# Patient Record
(demographics unavailable — no encounter records)

---

## 2018-01-01 NOTE — CONSULT
- Maternal History


Mother's Age: 43 yo


 Status: 


Mother's Blood Type: O pos


HBSAG: Negative


Date: 18


RPR: Negative


Date: 18


Group B Strep: Positive


GBS Treated in Labor: Yes


HIV: Negative





- Maternal Risks


OB Risks: AMA,  X3, h/o Cholecystectomy, vertigo tx with Meclizine prior to 

pregnancy- since resolved. CAN X1. Infant admitted to well baby at 1728.





[place] Data





- Admission


Date of Admission: 10/08/18


Admission Time: 17:13


Date of Delivery: 10/08/18


Time of Delivery: 17:13


Wks Gestation by Dates: 42.3


Wks Gestation by Sono: 39.6


Infant Gender: Female


Type of Delivery: 


Apgar Score @1 Minute: 8


Apgar score @ 5 Minutes: 9


Birth Weight: 3.067 kg


Birth Length: 45.72 cm


Head Circumference, Admission: 33


Chest Circumference: 35


Abdominal Girth: 32.5





- Vital Signs


  ** Left Upper Arm


Blood Pressure: 61/47


Blood Pressure Mean: 51





  ** Right Upper Arm


Blood Pressure: 69/46


Blood Pressure Mean: 53





  ** Left Calf


Blood Pressure: 62/41


Blood Pressure Mean: 48





  ** Right Calf


Blood Pressure: 68/42


Blood Pressure Mean: 50





- Labs


Labs: 


 Baby's Blood Type, Trent











Cord Blood Type  O POSITIVE   10/08/18  17:13    


 


TAN, Poly Interpret  Negative  (NEGATIVE)   10/08/18  17:13    














Level 2, History and Physical


 History: 





Present at delivery for NRFHT: Ex 39.6 weeker( 42 by dates) born via  to a 

43 yo  mother. GBS positive, no prolonged ROM, rest of prenatal labs 

negative. Baby was placed under warmer. PPV via bag and mask given for less 

then 1 min for decreased respiratory efforts low tone and cyanosis. By 1 min of 

life baby had spontaneous cry, good respiratory efforts and tone and color were 

improving. By 5 min of life baby was pink, strong cry, good respiratory 

efforts. Apgars 8 and 9 at 1 and 5 min of life. 





- [facility] Infant


Birth Weight: 3.067 kg


Birth Length: 45.72 cm


Vital Signs: 


 Vital Signs











Temperature  36.9 C   10/09/18 03:00


 


Pulse Rate  178 H  10/08/18 17:51


 


Respiratory Rate  51   10/08/18 17:51


 


Blood Pressure  61/47   10/09/18 00:15


 


O2 Sat by Pulse Oximetry (%)  100   10/08/18 17:51











Chest Circumference: 35


General Appearance: Yes: No Abnormalities, Well flexed, Full ROM, Spontaneous 

movements


Skin: Yes: No Abnormalities, Wrinkled


Head: Yes: Molding


Eyes: Yes: No Abnormalities


Ears: Yes: No Abnormalities


Nose: Yes: No Abnormalities


Mouth: Yes: No Abnormalities


Chest: Yes: No Abnormalities


Lungs/Respiratory: Yes: No Abnormalities, Bilateral good air entry


Cardiac: Yes: No Abnormalities


Abdomen: Yes: No Abnormalities, Umb Ves, 2 artery 1 vein


Gastrointestinal: Yes: No Abnormalities


Genitalia: No Abnormalities


Genitalia, Female: Yes: Hymenal tags


Anus: Yes: No Abnormalities


Extremities: Yes: No Abnormalities, 10 Fingers, 10 Toes


Spine: Yes: No Abnormalities


Reflexes: [place]: Present


Neuro: Yes: No Abnormalities, Alert, Active


Cry: Yes: No Abnormalities, Strong





Problem List





- Problems


(1) Term  delivered vaginally, current hospitalization


Code(s): Z38.00 - SINGLE LIVEBORN INFANT, DELIVERED VAGINALLY   





Assessment/Plan





Ex 39 weeker born via  to a 43 yo  mother with NRFHT. GBS positive, no 

prolonged ROM, rest of prenatal labs negative. Baby was placed under warmer. 

PPV via bag and mask given for less then 1 min for decreased respiratory 

efforts low tone and cyanosis. By 1 min of life baby had spontaneous cry, good 

respiratory efforts and tone and color were improving. By 5 min of life baby 

was pink, strong cry, good respiratory efforts. Apgars 8 and 9 at 1 and 5 min 

of life. Recommend routine care in well baby nursery.

## 2018-01-01 NOTE — DS
- Maternal History


Mother's Age: 43 yo


 Status: 


Mother's Blood Type: O pos


HBSAG: Negative


Date: 18


RPR: Negative


Date: 18


Group B Strep: Positive


GBS Treated in Labor: Yes


HIV: Negative





- Maternal Risks


OB Risks: AMA,  X3, h/o Cholecystectomy, vertigo tx with Meclizine prior to 

pregnancy- since resolved. CAN X1. Infant admitted to well baby at 1728.





[place] Data





- Admission


Date of Admission: 10/08/18


Admission Time: 17:13


Date of Delivery: 10/08/18


Time of Delivery: 17:13


Wks Gestation by Dates: 42.3


Wks Gestation by Sono: 39.6


Infant Gender: Female


Type of Delivery: 


Apgar Score @1 Minute: 8


Apgar score @ 5 Minutes: 9


Birth Weight: 6 lb 12.185 oz


Birth Length: 18 in


Head Circumference, Admission: 33


Chest Circumference: 35


Abdominal Girth: 32.5





- Vital Signs


  ** Left Upper Arm


Blood Pressure: 61/47


Blood Pressure Mean: 51





  ** Right Upper Arm


Blood Pressure: 69/46


Blood Pressure Mean: 53





  ** Left Calf


Blood Pressure: 62/41


Blood Pressure Mean: 48





  ** Right Calf


Blood Pressure: 68/42


Blood Pressure Mean: 50





- Hearing Screen


Left Ear: Passed


Right Ear: Passed


Hearing Screen Complete: 10/09/18





- Labs


Labs: 


 Transcutaneous Bilirubin











Transcutaneous Bilirubin       10/09/18





performed                      


 


Transcutaneous Bilirubin       6.8





result                         











 Baby's Blood Type, Trent











Cord Blood Type  O POSITIVE   10/08/18  17:13    


 


TAN, Poly Interpret  Negative  (NEGATIVE)   10/08/18  17:13    














- [facility] Screening


 Screening Card Number: [id number]





- Hepatitis B Vaccine Given


Date: 





10/9/18





[place] PE, Discharge





- Physical Exam


Last Weight Documented: 6 lb 8 oz


Vital Signs: 


 Vital Signs











Temperature  99.0 F   10/10/18 07:56


 


Pulse Rate  178 H  10/08/18 17:51


 


Respiratory Rate  51   10/08/18 17:51


 


Blood Pressure  61/47   10/09/18 11:37


 


O2 Sat by Pulse Oximetry (%)  100   10/08/18 17:51








 SpO2





Preductal SpO2, Right Arm        99


Postductal SpO2 [Right Leg]      99








General Appearance: Yes: No Abnormalities


Skin: Yes: No Abnormalities


Head: Yes: No Abnormalities


Eyes: Yes: No Abnormalities


Ears: Yes: No Abnormalities


Nose: Yes: No Abnormalities


Mouth: Yes: No Abnormalities


Chest: Yes: No Abnormalities


Lungs/Respiratory: Yes: No Abnormalities


Cardiac: Yes: No Abnormalities


Abdomen: Yes: No Abnormalities


Gastrointestinal: Yes: No Abnormalities


Genitalia: No Abnormalities


Genitalia, Female: Yes: Hymenal tags


Anus: Yes: No Abnormalities


Extremities: Yes: No Abnormalities


Spine: Yes: No Abnormalities


Reflexes: [place]: Present, Rooting: Present, Sucking: Present


Neuro: Yes: No Abnormalities, Alert, Active


Cry: Yes: Strong


Preductal SpO2, Right Arm: 99


  ** Right Leg


Postductal SpO2: 99


Other Findings/Remarks: 





Well 








Discharge Summary


Reason For Visit: 


Current Active Problems





Term  delivered vaginally, current hospitalization (Acute)








Condition: Good





- Instructions


Diet, Activity, Other Instructions: 


The baby has its first appointment to see 


Selma Murillo and Allegra at 


[address] [place] [address] 


([phone]) on Tuesday 10/16/18 at 9:30am sharp.


Disposition: HOME

## 2018-01-01 NOTE — HP
- Maternal History


Mother's Age: 43 yo


 Status: 


Mother's Blood Type: O pos


HBSAG: Negative


Date: 18


RPR: Negative


Date: 18


Group B Strep: Positive


GBS Treated in Labor: Yes


HIV: Negative





- Maternal Risks


OB Risks: AMA,  X3, h/o Cholecystectomy, vertigo tx with Meclizine prior to 

pregnancy- since resolved. CAN X1. Infant admitted to well baby at 1728.





[place] Data





- Admission


Date of Admission: 10/08/18


Admission Time: 17:13


Date of Delivery: 10/08/18


Time of Delivery: 17:13


Wks Gestation by Dates: 42.3


Wks Gestation by Sono: 39.6


Infant Gender: Female


Type of Delivery: 


Apgar Score @1 Minute: 8


Apgar score @ 5 Minutes: 9


Birth Weight: 6 lb 12.185 oz


Birth Length: 18 in


Head Circumference, Admission: 33


Chest Circumference: 35


Abdominal Girth: 32.5





- Vital Signs


  ** Left Upper Arm


Blood Pressure: 61/47


Blood Pressure Mean: 51





  ** Right Upper Arm


Blood Pressure: 69/46


Blood Pressure Mean: 53





  ** Left Calf


Blood Pressure: 62/41


Blood Pressure Mean: 48





  ** Right Calf


Blood Pressure: 68/42


Blood Pressure Mean: 50





- Labs


Labs: 


 Baby's Blood Type, Trent











Cord Blood Type  O POSITIVE   10/08/18  17:13    


 


TAN, Poly Interpret  Negative  (NEGATIVE)   10/08/18  17:13    














 Infant, Physical Exam





-  Infant, Admission Exam


Birth Weight: 6 lb 12.185 oz


Birth Length: 18 in


Chest Circumference: 35


Initial Vital Signs: 


 Initial Vital Signs











Temp Pulse Resp Pulse Ox


 


 99.0 F   178 H  51   100 


 


 10/08/18 17:51  10/08/18 17:51  10/08/18 17:51  10/08/18 17:51











General Appearance: Yes: No Abnormalities


Skin: Yes: No Abnormalities


Head: Yes: No Abnormalities


Eyes: Yes: No Abnormalities


Ears: Yes: No Abnormalities


Nose: Yes: No Abnormalities


Mouth: Yes: No Abnormalities


Chest: Yes: No Abnormalities


Lungs/Respiratory: Yes: No Abnormalities


Cardiac: Yes: No Abnormalities


Abdomen: Yes: No Abnormalities


Gastrointestinal: Yes: No Abnormalities


Genitalia: No Abnormalities


Anus: Yes: No Abnormalities


Extremities: Yes: No Abnormalities


Clavicles: No abnormalities


Spine: Yes: No Abnormalities


Reflexes: [place]: Present, Rooting: Present, Sucking: Present


Neuro: Yes: No Abnormalities, Alert, Active


Cry: Yes: Strong





Problem List





- Problems


(1) Term  delivered vaginally, current hospitalization


Assessment/Plan: 


 Laboratory Tests











  10/08/18





  17:13


 


Cord Blood Type  O POSITIVE


 


TAN, Poly Interpret  Negative








Patient is a well . Continue routine care.


Code(s): Z38.00 - SINGLE LIVEBORN INFANT, DELIVERED VAGINALLY

## 2018-01-01 NOTE — PN
Neonatology, Progress Note





- History of Present Illness


 History: 





Present at delivery for NRFHT: Ex 39 weeker born via  to a 45 yo  

mother. GBS positive, no prolonged ROM, rest of prenatal labs negative. Baby 

was placed under warmer. PPV via bag and mask given for less then 1 min for 

decreased respiratory efforts low tone and cyanosis. By 1 min of life baby had 

spontaneous cry, good respiratory efforts and tone and color were improving. By 

5 min of life baby was pink, strong cry, good respiratory efforts. Apgars 8 and 

9 at 1 and 5 min of life. 





-  Exam


Last weight documented: 3.067 kg


Chest Circumference: 35


Head Circumference: 33


Vital Signs: 


 Vital Signs











Temperature  36.9 C   10/09/18 03:00


 


Pulse Rate  178 H  10/08/18 17:51


 


Respiratory Rate  51   10/08/18 17:51


 


Blood Pressure  61/47   10/09/18 00:15


 


O2 Sat by Pulse Oximetry (%)  100   10/08/18 17:51











General Appearance: Yes: No Abnormalities, Well flexed, Full ROM, Spontaneous 

movements


Skin: Yes: No Abnormalities


Head: Yes: No Abnormalities


Eyes: Yes: No Abnormalities


Ears: Yes: No Abnormalities


Nose: Yes: No Abnormalities


Mouth: Yes: No Abnormalities


Chest: Yes: No Abnormalities


Lungs/Respiratory: Yes: No Abnormalities, Bilateral good air entry


Cardiac: Yes: No Abnormalities


Abdomen: Yes: No Abnormalities, Umb Ves, 2 artery 1 vein


Gastrointestinal: Yes: No Abnormalities


Genitalia: No Abnormalities


Anus: Yes: No Abnormalities


Extremities: Yes: No Abnormalities


Spine: Yes: No Abnormalities


Reflexes: [place]: Present


Neuro: Yes: No Abnormalities, Alert, Active


Cry: No Abnormalities, Strong


Intake and Output: 


 Intake + Output











 10/08/18 10/09/18





 23:59 11:59


 


Intake Total 25 75


 


Balance 25 75


 


Intake:  


 


  Oral 25 75


 


Other:  


 


  Breastfeeding Attempts Successful Successful


 


  # Voids  1


 


  Bowel Movement No 


 


  Weight 3.067 kg 


 


  Birth Weight 3.067 kg 


 


  Birth Length 45.72 cm 


 


  Weight Measurement Method Baby Scale 











Labs, Other Data: 


 Baby's Blood Type, Trent











Cord Blood Type  O POSITIVE   10/08/18  17:13    


 


TAN, Poly Interpret  Negative  (NEGATIVE)   10/08/18  17:13    











Other Findings/Remarks: 


 Baby's Blood Type, Trent











Cord Blood Type  O POSITIVE   10/08/18  17:13    


 


TAN, Poly Interpret  Negative  (NEGATIVE)   10/08/18  17:13    














Assessment/Plan





Ex 39 weeker born via  to a 45 yo  mother with NRFHT. GBS positive, no 

prolonged ROM, rest of prenatal labs negative. Baby was placed under warmer. 

PPV via bag and mask given for less then 1 min for decreased respiratory 

efforts low tone and cyanosis. By 1 min of life baby had spontaneous cry, good 

respiratory efforts and tone and color were improving. By 5 min of life baby 

was pink, strong cry, good respiratory efforts. Apgars 8 and 9 at 1 and 5 min 

of life. Recommend routine care in well baby nursery.